# Patient Record
Sex: FEMALE | Race: WHITE | NOT HISPANIC OR LATINO | Employment: FULL TIME | URBAN - METROPOLITAN AREA
[De-identification: names, ages, dates, MRNs, and addresses within clinical notes are randomized per-mention and may not be internally consistent; named-entity substitution may affect disease eponyms.]

---

## 2017-01-05 ENCOUNTER — OFFICE VISIT (OUTPATIENT)
Dept: NEUROSURGERY | Facility: CLINIC | Age: 52
End: 2017-01-05

## 2017-01-05 VITALS — WEIGHT: 126 LBS | BODY MASS INDEX: 20.99 KG/M2 | HEIGHT: 65 IN

## 2017-01-05 DIAGNOSIS — S33.5XXD SPRAIN OF LUMBAR REGION, SUBSEQUENT ENCOUNTER: Primary | ICD-10-CM

## 2017-01-05 PROCEDURE — 99213 OFFICE O/P EST LOW 20 MIN: CPT | Performed by: NEUROLOGICAL SURGERY

## 2017-01-05 NOTE — MR AVS SNAPSHOT
Henny Hayward   2017 11:30 AM   Office Visit    Dept Phone:  [phone]   Encounter #:  [id number]    Provider:  Rashawn Pate MD   Department:  [facility]                Your Full Care Plan              Your Updated Medication List          This list is accurate as of: 17  1:11 PM.  Always use your most recent med list.                cyclobenzaprine 10 MG tablet   Commonly known as:  FLEXERIL       diclofenac 75 MG EC tablet   Commonly known as:  VOLTAREN       gabapentin 100 MG capsule   Commonly known as:  NEURONTIN       metaxalone 800 MG tablet   Commonly known as:  SKELAXIN       NAPROXEN  MG EC tablet   Generic drug:  naproxen       predniSONE 10 MG tablet   Commonly known as:  DELTASONE       traMADol 50 MG tablet   Commonly known as:  ULTRAM               You Were Diagnosed With        Codes Comments    Sprain of lumbar region, subsequent encounter    -  Primary ICD-10-CM: S33.5XXD  ICD-9-CM: V58.89, 847.2       Instructions     None    Patient Instructions History      Upcoming Appointments     Visit Type Date Time Department    OFFICE VISIT 2017 11:30 AM [facility]      MyChart Signup     [facility] allows you to send messages to your doctor, view your test results, renew your prescriptions, schedule appointments, and more. To sign up, go to [employer] and click on the Sign Up Now link in the New User? box. Enter your [employer] Activation Code exactly as it appears below along with the last four digits of your Social Security Number and your Date of Birth () to complete the sign-up process. If you do not sign up before the expiration date, you must request a new code.    [employer] Activation Code: [email or web address]  Expires: 2017  1:11 PM    If you have questions, you can email [email or web address] or call [phone] to talk to our [employer] staff. Remember, [employer]  "is NOT to be used for urgent needs. For medical emergencies, dial 911.               Other Info from Your Visit           Allergies     No Known Allergies      Reason for Visit     Back Pain           Vital Signs     Height Weight Body Mass Index Smoking Status          65\" (165.1 cm) 126 lb (57.2 kg) 20.97 kg/m2 Current Every Day Smoker        Problems and Diagnoses Noted     Lumbar sprain    -  Primary        "

## 2017-01-05 NOTE — PROGRESS NOTES
Subjective   Henny Hayward is a 51 y.o. female who presents for follow up of left back and leg pain.     The patient is a 51-year-old "[employer] employee with a history of an injury at work in August with subsequent pain in the back radiating to the left hip and leg.  MRI scan showed a minor disc bulge at L5-S1 on the right in the foramen but no evidence of nerve root compression on the left.  She has been treated with physical therapy.  I saw her on 10/27/16 and again on 12/1/16.    She has now had significant reduction in the level of pain with only a minor left hip complaints from time to time.  There is no need for additional studies such as the myelogram suggested on the last visit.  She is ready for release for work without restriction.    The following portions of the patient's history were reviewed, updated as appropriate and approved: allergies, current medications, past family history, past medical history, past social history, past surgical history, review of systems and problem list.     Review of Systems   Constitutional: Negative for activity change, appetite change, chills, diaphoresis, fatigue, fever and unexpected weight change.   HENT: Positive for congestion and postnasal drip. Negative for dental problem, drooling, ear discharge, ear pain, facial swelling, hearing loss, mouth sores, nosebleeds, rhinorrhea, sinus pressure, sneezing, sore throat, tinnitus, trouble swallowing and voice change.    Eyes: Negative for photophobia, pain, discharge, redness, itching and visual disturbance.   Respiratory: Negative for apnea, cough, choking, chest tightness, shortness of breath, wheezing and stridor.    Cardiovascular: Negative for chest pain, palpitations and leg swelling.   Gastrointestinal: Negative for abdominal distention, abdominal pain, anal bleeding, blood in stool, constipation, diarrhea, nausea, rectal pain and vomiting.   Endocrine: Negative for cold intolerance, heat intolerance, polydipsia,  polyphagia and polyuria.   Genitourinary: Negative for decreased urine volume, difficulty urinating, dysuria, enuresis, flank pain, frequency, genital sores, hematuria and urgency.   Musculoskeletal: Positive for back pain. Negative for arthralgias, gait problem, joint swelling, myalgias, neck pain and neck stiffness.   Skin: Negative for color change, pallor, rash and wound.   Allergic/Immunologic: Negative for environmental allergies, food allergies and immunocompromised state.   Neurological: Positive for numbness. Negative for dizziness, tremors, seizures, syncope, facial asymmetry, speech difficulty, weakness, light-headedness and headaches.   Hematological: Negative for adenopathy. Does not bruise/bleed easily.   Psychiatric/Behavioral: Negative for agitation, behavioral problems, confusion, decreased concentration, dysphoric mood, hallucinations, self-injury, sleep disturbance and suicidal ideas. The patient is not nervous/anxious and is not hyperactive.    All other systems reviewed and are negative.      Objective    NEUROLOGICAL EXAMINATION:      MUSCULOSKELETAL:  SLR negative.    MOTOR: No motor weakness in the lower extremities.    SENSATION:  Intact sensation in both lower extremities.    REFLEXES:  DTR 2+ and symmetrical in the lower extremities.    Assessment   Lumbar strain.       Plan   Satisfactory recovery with physical therapy.  Ready to return to work.  No restrictions.  No impairment.        Rashawn Pate MD

## 2017-01-05 NOTE — LETTER
January 5, 2017     Patient: Henny Hayward   YOB: 1965   Date of Visit: 1/5/2017       To Whom It May Concern:    It is my medical opinion that Henny Hayward may return to full duty immediately with no restrictions.           Sincerely,        Rashawn Pate MD    CC: No Recipients